# Patient Record
Sex: FEMALE | Race: WHITE
[De-identification: names, ages, dates, MRNs, and addresses within clinical notes are randomized per-mention and may not be internally consistent; named-entity substitution may affect disease eponyms.]

---

## 2023-08-31 ENCOUNTER — HOSPITAL ENCOUNTER (OUTPATIENT)
Dept: HOSPITAL 47 - RADMAMWWP | Age: 53
Discharge: HOME | End: 2023-08-31
Attending: FAMILY MEDICINE
Payer: COMMERCIAL

## 2023-08-31 DIAGNOSIS — Z12.31: Primary | ICD-10-CM

## 2023-08-31 PROCEDURE — 77067 SCR MAMMO BI INCL CAD: CPT

## 2023-09-03 NOTE — MM
Reason for Exam: Screening  (asymptomatic). 

Last mammogram was performed 11 year(s) and 6 month(s) ago. 





Patient History: 

Menarche at age 11. First Full-Term Pregnancy at age 19. Hormonal Contraceptives, starting at age 15

for 2 years. 





Risk Values: 

Lisette 5 year model risk: 1.0%.

NCI Lifetime model risk: 7.5%.





Prior Study Comparison: 

No prior studies available for comparison. 





Tissue Density: 

There are scattered fibroglandular densities.





Findings: 

Analyzed By CAD. 

There is no suspicious group of microcalcifications, significant masses, or other discrete

abnormality in either breast. 





Overall Assessment: Negative, BI-RAD 1





Management: 

Screening Mammogram of both breasts in 1 year.

.



Patient should continue monthly self-breast exams.  A clinical breast exam by your physician is

recommended on an annual basis.

This exam should not preclude additional follow-up of suspicious palpable abnormalities.



Note on Lisette scores and lifetime risk:

1. A Lisette score greater than 3% is considered moderate risk. If this is the case, consider

specialist referral to assess eligibility for a risk reducing agent.

2. If overall lifetime risk for the development of breast cancer is 20% or higher, the patient may

qualify for future screening with alternating mammogram and breast MRI.



Electronically signed and approved by: Tamra Hudson M.D. Radiologist
0

## 2023-10-24 ENCOUNTER — HOSPITAL ENCOUNTER (OUTPATIENT)
Dept: HOSPITAL 47 - ORWHC2ENDO | Age: 53
Discharge: HOME | End: 2023-10-24
Attending: INTERNAL MEDICINE
Payer: COMMERCIAL

## 2023-10-24 VITALS — SYSTOLIC BLOOD PRESSURE: 121 MMHG | HEART RATE: 54 BPM | DIASTOLIC BLOOD PRESSURE: 84 MMHG

## 2023-10-24 VITALS — RESPIRATION RATE: 16 BRPM

## 2023-10-24 DIAGNOSIS — F17.200: ICD-10-CM

## 2023-10-24 DIAGNOSIS — Z12.11: Primary | ICD-10-CM

## 2023-10-24 DIAGNOSIS — Z79.899: ICD-10-CM

## 2023-10-24 DIAGNOSIS — E78.5: ICD-10-CM

## 2023-10-24 DIAGNOSIS — K57.30: ICD-10-CM

## 2023-10-24 PROCEDURE — 45378 DIAGNOSTIC COLONOSCOPY: CPT

## 2023-10-24 PROCEDURE — 81025 URINE PREGNANCY TEST: CPT

## 2023-10-24 NOTE — P.PCN
Date of Procedure: 10/24/23


Procedure(s) Performed: 


BRIEF HISTORY: Patient is a [id number] old pleasant white female scheduled for an 

elective colonoscopy as a part of screening for colon cancer.





PROCEDURE PERFORMED: Colonoscopy. 





PREOPERATIVE DIAGNOSIS: Screening for colon cancer. 





IV sedation per Anesthesia. 





PROCEDURE: After informed consent was obtained, the patient, was brought into 

the endoscopy unit. IV sedation was administered by Anesthesia under continuous 

monitoring.  Digital rectal examination was normal. Initially the Olympus CF-160

flexible video colonoscope was then inserted in the rectum, gradually advanced 

into the cecum without any difficulty. Careful examination was performed as the 

scope was gradually being withdrawn. Ileocecal valve and the appendiceal orifice

were visualized and appeared normal.  Prep was excellent. Mucosa of the cecum, 

ascending colon, transverse colon, descending colon, sigmoid colon, and rectum 

appeared normal. scattered sigmoid diverticulosis.   Retroflexion was performed 

in the rectum and no lesions were seen. The patient tolerated the procedure 

well. 





IMPRESSION: 


Normal-appearing colon from rectum to cecum no evidence of colorectal neoplasia.


Scattered sigmoid diverticulosis.





RECOMMENDATIONS:  Findings of this examination were discussed with the patiewell

as her family.  She was advised to have a repeat screening colonoscopy in 10 

years.